# Patient Record
Sex: FEMALE | Race: WHITE | NOT HISPANIC OR LATINO | URBAN - METROPOLITAN AREA
[De-identification: names, ages, dates, MRNs, and addresses within clinical notes are randomized per-mention and may not be internally consistent; named-entity substitution may affect disease eponyms.]

---

## 2018-05-16 ENCOUNTER — OUTPATIENT (OUTPATIENT)
Dept: OUTPATIENT SERVICES | Facility: HOSPITAL | Age: 53
LOS: 1 days | End: 2018-05-16
Payer: COMMERCIAL

## 2018-05-16 VITALS
HEART RATE: 72 BPM | OXYGEN SATURATION: 100 % | RESPIRATION RATE: 16 BRPM | HEIGHT: 66 IN | DIASTOLIC BLOOD PRESSURE: 75 MMHG | WEIGHT: 225.97 LBS | SYSTOLIC BLOOD PRESSURE: 112 MMHG | TEMPERATURE: 98 F

## 2018-05-16 DIAGNOSIS — Z98.890 OTHER SPECIFIED POSTPROCEDURAL STATES: Chronic | ICD-10-CM

## 2018-05-16 DIAGNOSIS — M65.30 TRIGGER FINGER, UNSPECIFIED FINGER: ICD-10-CM

## 2018-05-16 DIAGNOSIS — Z01.818 ENCOUNTER FOR OTHER PREPROCEDURAL EXAMINATION: ICD-10-CM

## 2018-05-16 DIAGNOSIS — M65.331 TRIGGER FINGER, RIGHT MIDDLE FINGER: ICD-10-CM

## 2018-05-16 DIAGNOSIS — M19.90 UNSPECIFIED OSTEOARTHRITIS, UNSPECIFIED SITE: ICD-10-CM

## 2018-05-16 DIAGNOSIS — E11.9 TYPE 2 DIABETES MELLITUS WITHOUT COMPLICATIONS: ICD-10-CM

## 2018-05-16 DIAGNOSIS — K21.9 GASTRO-ESOPHAGEAL REFLUX DISEASE WITHOUT ESOPHAGITIS: ICD-10-CM

## 2018-05-16 DIAGNOSIS — M65.311 TRIGGER THUMB, RIGHT THUMB: ICD-10-CM

## 2018-05-16 DIAGNOSIS — M67.441 GANGLION, RIGHT HAND: ICD-10-CM

## 2018-05-16 LAB
ANION GAP SERPL CALC-SCNC: 14 MMOL/L — SIGNIFICANT CHANGE UP (ref 5–17)
BUN SERPL-MCNC: 17 MG/DL — SIGNIFICANT CHANGE UP (ref 7–23)
CALCIUM SERPL-MCNC: 9.5 MG/DL — SIGNIFICANT CHANGE UP (ref 8.4–10.5)
CHLORIDE SERPL-SCNC: 103 MMOL/L — SIGNIFICANT CHANGE UP (ref 96–108)
CO2 SERPL-SCNC: 25 MMOL/L — SIGNIFICANT CHANGE UP (ref 22–31)
CREAT SERPL-MCNC: 1.05 MG/DL — SIGNIFICANT CHANGE UP (ref 0.5–1.3)
GLUCOSE SERPL-MCNC: 101 MG/DL — HIGH (ref 70–99)
HBA1C BLD-MCNC: 5.8 % — HIGH (ref 4–5.6)
HCT VFR BLD CALC: 38.1 % — SIGNIFICANT CHANGE UP (ref 34.5–45)
HGB BLD-MCNC: 12.5 G/DL — SIGNIFICANT CHANGE UP (ref 11.5–15.5)
MCHC RBC-ENTMCNC: 32.8 GM/DL — SIGNIFICANT CHANGE UP (ref 32–36)
MCHC RBC-ENTMCNC: 33.1 PG — SIGNIFICANT CHANGE UP (ref 27–34)
MCV RBC AUTO: 100.8 FL — HIGH (ref 80–100)
PLATELET # BLD AUTO: 221 K/UL — SIGNIFICANT CHANGE UP (ref 150–400)
POTASSIUM SERPL-MCNC: 4.7 MMOL/L — SIGNIFICANT CHANGE UP (ref 3.5–5.3)
POTASSIUM SERPL-SCNC: 4.7 MMOL/L — SIGNIFICANT CHANGE UP (ref 3.5–5.3)
RBC # BLD: 3.78 M/UL — LOW (ref 3.8–5.2)
RBC # FLD: 13.9 % — SIGNIFICANT CHANGE UP (ref 10.3–14.5)
SODIUM SERPL-SCNC: 142 MMOL/L — SIGNIFICANT CHANGE UP (ref 135–145)
WBC # BLD: 5.55 K/UL — SIGNIFICANT CHANGE UP (ref 3.8–10.5)
WBC # FLD AUTO: 5.55 K/UL — SIGNIFICANT CHANGE UP (ref 3.8–10.5)

## 2018-05-16 PROCEDURE — 85027 COMPLETE CBC AUTOMATED: CPT

## 2018-05-16 PROCEDURE — 80048 BASIC METABOLIC PNL TOTAL CA: CPT

## 2018-05-16 PROCEDURE — G0463: CPT

## 2018-05-16 PROCEDURE — 83036 HEMOGLOBIN GLYCOSYLATED A1C: CPT

## 2018-05-16 RX ORDER — LIDOCAINE HCL 20 MG/ML
0.2 VIAL (ML) INJECTION ONCE
Qty: 0 | Refills: 0 | Status: DISCONTINUED | OUTPATIENT
Start: 2018-05-23 | End: 2018-06-07

## 2018-05-16 RX ORDER — SODIUM CHLORIDE 9 MG/ML
3 INJECTION INTRAMUSCULAR; INTRAVENOUS; SUBCUTANEOUS EVERY 8 HOURS
Qty: 0 | Refills: 0 | Status: DISCONTINUED | OUTPATIENT
Start: 2018-05-23 | End: 2018-06-07

## 2018-05-16 NOTE — H&P PST ADULT - PROBLEM SELECTOR PLAN 1
Right  3rd and thumb trigger releases ,excision of retinacular cyst   PSt instruction  given with antibacterial  soap x 3 days preop for prophylaxis of post op infection   CBC/BMP and HgA1c drawn sent pending result . Will see PMD for preop medical evaluation

## 2018-05-16 NOTE — H&P PST ADULT - NSANTHOSAYNRD_GEN_A_CORE
No. BRIDGETTE screening performed.  STOP BANG Legend: 0-2 = LOW Risk; 3-4 = INTERMEDIATE Risk; 5-8 = HIGH Risk

## 2018-05-16 NOTE — H&P PST ADULT - PROBLEM SELECTOR PLAN 2
Hga1c /BMP drawn sent pending result   To continue taking diabetes medication regularly and to hold meds on DOS    Fingerstick for glucose level on DOS

## 2018-05-16 NOTE — H&P PST ADULT - PMH
Arthritis  on meds stable  Diabetes  on meds  GERD (gastroesophageal reflux disease)    Hyperlipidemia  on meds  Obesity (BMI 30-39.9)  on gluten free diet lost 25 lbs in 4 months  Sjogren's syndrome  on meds

## 2018-05-16 NOTE — H&P PST ADULT - HISTORY OF PRESENT ILLNESS
53 y/o female with hx of diabetes, arthritis and sjogren syndrome , obesity . Patient came in for trigger finger release . patient has hx of pain on affected fingers that gotten worst recently . Patient was referred to Dr Wallace evaluated and scheduled this pro dure for treatment. 51 y/o female with hx of diabetes, arthritis and sjogren syndrome , obesity .hyperlipidemia and GERD. Patient came in for trigger finger release . patient has hx of pain on affected fingers that gotten worst recently . Patient was referred to Dr Wallace evaluated and scheduled this procedure for treatment.

## 2018-05-16 NOTE — H&P PST ADULT - ATTENDING COMMENTS
The patient is admitted today for release of right III trigger finger and release of right trigger thumb with retinacular cyst excision.  I have reviewed the procedure in detail again today with the patient.  The numerous risks, benefits, alternatives, possible complications and expectations are reviewed.  All questions have been thoroughly answered and the patient has verbalized understanding of all of the above and gives consent to proceed

## 2018-05-23 ENCOUNTER — OUTPATIENT (OUTPATIENT)
Dept: OUTPATIENT SERVICES | Facility: HOSPITAL | Age: 53
LOS: 1 days | End: 2018-05-23
Payer: COMMERCIAL

## 2018-05-23 VITALS
SYSTOLIC BLOOD PRESSURE: 125 MMHG | OXYGEN SATURATION: 97 % | HEART RATE: 71 BPM | HEIGHT: 66 IN | WEIGHT: 225.97 LBS | RESPIRATION RATE: 16 BRPM | TEMPERATURE: 98 F | DIASTOLIC BLOOD PRESSURE: 81 MMHG

## 2018-05-23 VITALS
DIASTOLIC BLOOD PRESSURE: 76 MMHG | SYSTOLIC BLOOD PRESSURE: 115 MMHG | OXYGEN SATURATION: 98 % | HEART RATE: 71 BPM | RESPIRATION RATE: 18 BRPM

## 2018-05-23 DIAGNOSIS — M67.441 GANGLION, RIGHT HAND: ICD-10-CM

## 2018-05-23 DIAGNOSIS — M65.331 TRIGGER FINGER, RIGHT MIDDLE FINGER: ICD-10-CM

## 2018-05-23 DIAGNOSIS — M65.311 TRIGGER THUMB, RIGHT THUMB: ICD-10-CM

## 2018-05-23 DIAGNOSIS — Z01.818 ENCOUNTER FOR OTHER PREPROCEDURAL EXAMINATION: ICD-10-CM

## 2018-05-23 DIAGNOSIS — Z98.890 OTHER SPECIFIED POSTPROCEDURAL STATES: Chronic | ICD-10-CM

## 2018-05-23 LAB — GLUCOSE BLDC GLUCOMTR-MCNC: 129 MG/DL — HIGH (ref 70–99)

## 2018-05-23 PROCEDURE — 82962 GLUCOSE BLOOD TEST: CPT

## 2018-05-23 PROCEDURE — 26160 REMOVE TENDON SHEATH LESION: CPT | Mod: F5

## 2018-05-23 PROCEDURE — 88305 TISSUE EXAM BY PATHOLOGIST: CPT | Mod: 26

## 2018-05-23 PROCEDURE — 26055 INCISE FINGER TENDON SHEATH: CPT | Mod: F7

## 2018-05-23 PROCEDURE — 88305 TISSUE EXAM BY PATHOLOGIST: CPT

## 2018-05-23 RX ORDER — METFORMIN HYDROCHLORIDE 850 MG/1
0 TABLET ORAL
Qty: 0 | Refills: 0 | COMMUNITY

## 2018-05-23 RX ORDER — SODIUM CHLORIDE 9 MG/ML
1000 INJECTION, SOLUTION INTRAVENOUS
Qty: 0 | Refills: 0 | Status: DISCONTINUED | OUTPATIENT
Start: 2018-05-23 | End: 2018-06-07

## 2018-05-23 RX ORDER — GABAPENTIN 400 MG/1
0 CAPSULE ORAL
Qty: 0 | Refills: 0 | COMMUNITY

## 2018-05-23 RX ORDER — OMEPRAZOLE 10 MG/1
1 CAPSULE, DELAYED RELEASE ORAL
Qty: 0 | Refills: 0 | COMMUNITY

## 2018-05-23 RX ORDER — VITAMIN E 100 UNIT
1 CAPSULE ORAL
Qty: 0 | Refills: 0 | COMMUNITY

## 2018-05-23 RX ORDER — CHOLECALCIFEROL (VITAMIN D3) 125 MCG
1 CAPSULE ORAL
Qty: 0 | Refills: 0 | COMMUNITY

## 2018-05-23 RX ORDER — METHOTREXATE 2.5 MG/1
0 TABLET ORAL
Qty: 0 | Refills: 0 | COMMUNITY

## 2018-05-23 RX ORDER — HYDROMORPHONE HYDROCHLORIDE 2 MG/ML
0.25 INJECTION INTRAMUSCULAR; INTRAVENOUS; SUBCUTANEOUS
Qty: 0 | Refills: 0 | Status: DISCONTINUED | OUTPATIENT
Start: 2018-05-23 | End: 2018-05-23

## 2018-05-23 RX ORDER — FOLIC ACID 0.8 MG
1 TABLET ORAL
Qty: 0 | Refills: 0 | COMMUNITY

## 2018-05-23 RX ORDER — MULTIVIT-MIN/FERROUS GLUCONATE 9 MG/15 ML
0 LIQUID (ML) ORAL
Qty: 0 | Refills: 0 | COMMUNITY

## 2018-05-23 RX ORDER — DICLOFENAC SODIUM 75 MG/1
1 TABLET, DELAYED RELEASE ORAL
Qty: 0 | Refills: 0 | COMMUNITY

## 2018-05-23 RX ORDER — ATORVASTATIN CALCIUM 80 MG/1
1 TABLET, FILM COATED ORAL
Qty: 0 | Refills: 0 | COMMUNITY

## 2018-05-23 RX ORDER — HYDROXYCHLOROQUINE SULFATE 200 MG
0 TABLET ORAL
Qty: 0 | Refills: 0 | COMMUNITY

## 2018-05-23 RX ORDER — ASCORBIC ACID 60 MG
1 TABLET,CHEWABLE ORAL
Qty: 0 | Refills: 0 | COMMUNITY

## 2018-05-23 RX ORDER — CYCLOBENZAPRINE HYDROCHLORIDE 10 MG/1
0 TABLET, FILM COATED ORAL
Qty: 0 | Refills: 0 | COMMUNITY

## 2018-05-23 RX ORDER — PREGABALIN 225 MG/1
1 CAPSULE ORAL
Qty: 0 | Refills: 0 | COMMUNITY

## 2018-05-23 RX ORDER — CEVIMELINE 30 MG/1
1 CAPSULE ORAL
Qty: 0 | Refills: 0 | COMMUNITY

## 2018-05-23 RX ORDER — ONDANSETRON 8 MG/1
4 TABLET, FILM COATED ORAL ONCE
Qty: 0 | Refills: 0 | Status: DISCONTINUED | OUTPATIENT
Start: 2018-05-23 | End: 2018-06-07

## 2018-05-23 RX ORDER — OXYCODONE HYDROCHLORIDE 5 MG/1
5 TABLET ORAL ONCE
Qty: 0 | Refills: 0 | Status: DISCONTINUED | OUTPATIENT
Start: 2018-05-23 | End: 2018-05-23

## 2018-05-23 RX ORDER — SITAGLIPTIN 50 MG/1
1 TABLET, FILM COATED ORAL
Qty: 0 | Refills: 0 | COMMUNITY

## 2018-05-23 NOTE — ASU DISCHARGE PLAN (ADULT/PEDIATRIC). - ITEMS TO FOLLOWUP WITH YOUR PHYSICIAN'S
Please follow up with Dr. Wallace within 1-2 weeks after discharge from the hospital. You may call [phone] to schedule an appointment.

## 2018-05-23 NOTE — ASU DISCHARGE PLAN (ADULT/PEDIATRIC). - NOTIFY
Numbness, color, or temperature change to extremity/Bleeding that does not stop/Inability to Tolerate Liquids or Foods/Increased Irritability or Sluggishness/Excessive Diarrhea/Swelling that continues/Persistent Nausea and Vomiting

## 2018-05-23 NOTE — BRIEF OPERATIVE NOTE - POST-OP DX
Ganglion cyst of flexor tendon sheath  05/23/2018    Active  Nathaniel Khan  Trigger finger  05/23/2018    Active  Nathaniel Khan

## 2018-05-23 NOTE — ASU DISCHARGE PLAN (ADULT/PEDIATRIC). - NURSING INSTRUCTIONS
Tylenol as needed for pain.  Resume regular medications.  Please read and follow preprinted MD-specific instruction sheet provided.  Keep right hand elevated as much as possible.

## 2018-05-23 NOTE — BRIEF OPERATIVE NOTE - PROCEDURE
<<-----Click on this checkbox to enter Procedure Trigger finger release  05/23/2018    Active  [id number]

## 2018-05-29 LAB — SURGICAL PATHOLOGY STUDY: SIGNIFICANT CHANGE UP

## 2019-06-17 NOTE — ASU PREOP CHECKLIST - HIBICLENS SHOWER 1 DATE
Epidermal Autograft Text: The defect edges were debeveled with a #15 scalpel blade.  Given the location of the defect, shape of the defect and the proximity to free margins an epidermal autograft was deemed most appropriate.  Using a sterile surgical marker, the primary defect shape was transferred to the donor site. The epidermal graft was then harvested.  The skin graft was then placed in the primary defect and oriented appropriately. 22-May-2018

## 2022-07-11 NOTE — H&P PST ADULT - PROBLEM SELECTOR PROBLEM 1
OP Anticoagulation Service Note    Date: 2022    Anticoagulation Summary  As of 2022    INR goal:  2.0-3.0   TTR:  74.5 % (2.3 y)   INR used for dosin.10 (2022)   Warfarin maintenance plan:  3 mg (6 mg x 0.5) every Mon, Fri; 6 mg (6 mg x 1) all other days   Weekly warfarin total:  36 mg   Plan last modified:  Dallin Viveros, PharmD (2021)   Next INR check:  2022   Target end date:  Indefinite    Indications    Paroxysmal atrial fibrillation (HCC) [I48.0]             Anticoagulation Episode Summary     INR check location:      Preferred lab:      Send INR reminders to:      Comments:  Elisa      Anticoagulation Care Providers     Provider Role Specialty Phone number    La Christian Paredes M.D. Referring Internal Medicine [phone]    [facility] Responsible  [phone]        Anticoagulation Patient Findings      HPI:   The reason for today's call is to prevent morbidity and mortality from a blood clot and/or stroke and to reduce the risk of bleeding while on a anticoagulant.     PCP:  Asael Pink M.D.  [address] [place] [phone]    Assessment:     • INR  therapeutic.     Lab Results   Component Value Date/Time    BUN 41 (H) 2022 09:08 AM    CREATININE 1.56 (H) 2022 09:08 AM     Lab Results   Component Value Date/Time    HEMOGLOBIN 13.4 2022 09:08 AM    HEMATOCRIT 43.3 2022 09:08 AM    PLATELETCT 292 2022 09:08 AM    ALKPHOSPHAT 81 2022 09:08 AM    ASTSGOT 25 2022 09:08 AM    ALTSGPT 36 2022 09:08 AM          Current Outpatient Medications:   •  warfarin, TAKE ONE TABLET DAILY AS DIRECTED BY [facility] ANTICOAGULATION SERIVCES  •  famotidine, 40 mg, Oral, DAILY  •  omeprazole, 20 mg, Oral, QDAY  •  losartan, TAKE 1 TABLET BY MOUTH EVERY DAY  •  Non Formulary Request, Indications: Balance Nature supplement  •  spironolactone, 25 mg, Oral, QDAY  •  levothyroxine, 150 mcg, Oral, AM ES  •   bisoprolol, 10 mg, Oral, DAILY  •  Vitamin D, 5,000 Units, Oral, DAILY  •  Cosentyx Sensoready Pen, Inject 300mg Sub Cut once monthly  •  [employer] Vitamin C, TAKE 1 TABLET BY MOUTH EVERY DAY  •  atorvastatin, TAKE 1 TABLET BY MOUTH AT  BEDTIME  •  loratadine, 10 mg, Oral, BID      Plan:     • Continue the same warfarin dose, as noted above.       Follow-up:     • test in 2 weeks.        Additional information discussed with patient:     • Asked patient to please call the anticoagulation clinic if they have any signs/symptoms of bleeding and/or thrombosis or any changes to diet or medications.      National recommendations regarding anticoagulation therapy:     The CHEST guidelines recommends frequent INR monitoring at regular intervals (a few days up to a max of 12 weeks) to ensure patients are on the proper dose of warfarin, and patients are not having any complications from therapy.  INRs can dramatically change over a short time period due to diet, medications, and medical conditions.     [facility]  Phone [phone] fax [phone]    This note was created using voice recognition software (Dragon). The accuracy of the dictation is limited by the abilities of the software. I have reviewed the note prior to signing, however some errors in grammar and context are still possible. If you have any questions related to this note please do not hesitate to contact our office.      Trigger finger, right

## 2023-12-19 NOTE — H&P PST ADULT - NEUROLOGICAL
----- Message from Risa Ochoa MA sent at 12/18/2023  3:42 PM CST -----  Regarding: FW: MAMMO SCREENING [facility]  Pt was called more then 3 times to schedule, please send pt letter     ----- Message -----  From: Gladis Morgan LPN  Sent: 12/18/2023  12:00 AM CST  To: On[facility] Team 3 Msg Pool  Subject: FW: MAMMO SCREENING [facility]                       Please track scheduling.   Thank You!  ----- Message -----  From: Gladis Morgan LPN  Sent: 12/13/2023  12:00 AM CST  To: On[facility] Team 3 Msg Pool  Subject: FW: MAMMO SCREENING [facility]                       Called out to patient in attempt to schedule her Mammo, no answer, voicemail was left with our office number and central scheduling's. Please track scheduling.   Thank You!  ----- Message -----  From: Chelsea Lenz MA  Sent: 12/11/2023  12:10 PM CST  To: Onc [facility] Team 3 Msg Pool; #  Subject: RE: MAMMO SCREENING [facility]                       Called and LVM for pt to contact central scheduling and get Mammo scheduled.  Track for scan     ----- Message -----  From: Ofe Ruvalcaba RN  Sent: 12/8/2023   4:29 PM CST  To: Onc [facility] Team 3 Msg Pool  Subject: FW: MAMMO SCREENING [facility]                       Please assist patient to schedule mammo. Thank you~!   ----- Message -----  From: Elissa Mcarthur  Sent: 12/8/2023  12:20 PM CST  To: [facility] Nurse Msg Pool  Subject: MAMMO SCREENING [facility]                           We have made two attempts to schedule the patient for the Mammo Screening [facility] that you ordered.    At this time we will be contacting the patient only once more to schedule over the phone. We will keep the order on file if you wish to have the patient call us to schedule. Our phone number is [phone].  We will also send the patient a post card with our phone number and hours if they decide to schedule they can call us at their convenience. If you feel the patient no longer needs this order, please advise the  patient and cancel the order.     Elissa Mcarthur  Ins[employer] Clearance Specialist, Sr.  Preservice Department               negative Alert & oriented; no sensory, motor or coordination deficits, normal reflexes